# Patient Record
Sex: FEMALE | URBAN - NONMETROPOLITAN AREA
[De-identification: names, ages, dates, MRNs, and addresses within clinical notes are randomized per-mention and may not be internally consistent; named-entity substitution may affect disease eponyms.]

---

## 2022-11-21 ENCOUNTER — TELEPHONE (OUTPATIENT)
Dept: SURGERY | Age: 67
End: 2022-11-21

## 2022-11-21 RX ORDER — ACETAMINOPHEN 500 MG
500 TABLET ORAL EVERY 6 HOURS PRN
COMMUNITY

## 2022-11-21 NOTE — TELEPHONE ENCOUNTER
[facility] Colonoscopy Worksheet    Patient Name: Pepper Mercer  : 1955  Primary Care Physician: Dr. Beatrice Aaron Date: 2022    Surgery Location:   [][place] [] [place] [x] [facility] [] [facility]    Why has a Colonoscopy been recommended for you? Over due for one    To properly code your procedure we must ask the following questions. PLEASE CHECK ALL THAT APPLY. Are you currently having any of the following symptoms? No    [] Rectal Bleeding (K62.5) [] Bloody Stool (K92.1) [] Dark Tarry Stool (K92.1)  [] Fecal Occult Positive Blood (confirmed by blood test R19.5)  [] Anemia (low hemoglobin D64.9) [] Abdominal Pain (R10.84) [] Diarrhea (R19.7)    **If you have any of these symptoms your colonoscopy is diagnostic and must be coded as such. It will not be coded as a screening colonoscopy. If you have no symptoms but have a personal history of polyps or a family history of colon cancer you fall in the high risk-screening category and we need to know more information. If you have had a polyp or polyps removed at your last colonoscopy then the first scope after that is considered diagnostic. Also if you have irritable bowel syndrome Chron's disease, diverticulitis or colon cancer then the scope would be a diagnostic colonoscopy. Have you ever had a colonoscopy? [x] Yes  [] No    If so, where and when was that done?  [facility]    Was anything found during the last scope? no    Was it removed? Do you have a family history of colon cancer? no      Have you personally been diagnosed with colon cancer? no    Any tobacco use? [] Yes    [x] No    Any alcohol use? [x] Yes    [] No  If yes, how often? 1 drink at supper    In the last six (6) months have you experienced any of the following symptoms?    [] Blood from the rectum or stool  [] Abdominal Pain   [] Diarrhea  [] Itching of rectum   [] Vomiting  [] Constipation   [] Black stools  [] Bloating [] Mucous in your stool  [] [place]   [] Change in bowel habits    Do you have allergies? [] Yes  If yes, please list:   [x] No    Do you take Warfarin, Coumadin, Plavix, Eliquis, Xarelto, or aspirin OR do you take a medication that thins your blood? [] Yes  [x] No    Please list all of your medications including over-the-counter and herbal supplements  Tylenol PRN                          List your past surgical procedures    Colonoscopy                           Medical History  Do you have any history of:  [x] None [] Heart Disease [] Hypertension  [] Diabetes [] Seizures [] Respiratory/Asthma  [] Sleep Apnea [] G.E.R.D [] Blood Disorder  [] Vascular Disease [] Depression    List the medical problems you are being treated for                                History of MRSA? No        Have you check with your insurance company to see what you BENEFITS are id you have had a colonoscopy? If you have not check with them we encourage you to find this information out before the procedure. Below are codes you may need to five them.        CPT and Diagnosis: FOR OFFICE USE ONLY  57401 Diagnostic colonoscopy- All patients Symptoms (check above or list):   50222 Screening colonoscopy for NON-MEDICARE patients Diagnosis code: Z12.11   Screening colonoscopy for MEDICARE patients Diagnosis code: Z12.11   Screening colonoscopy for MEDICARE- HIGH RISK PATIENTS   Z85.038- Personal history malignant neoplasm, large intestine   Z85.048- Personal history malignant neoplasm, rectum/anus   Z86.010- Personal history colon polyps   Z80.0- Family history malignant neoplasm   Z83.79- Family history digestive disorder    Reviewed by nurse: Lesa Coates LPN      SURGERY SCHEDULED FOR 01/09/2023        ADDITIONAL NOTES: